# Patient Record
Sex: FEMALE | Race: WHITE | Employment: FULL TIME | URBAN - METROPOLITAN AREA
[De-identification: names, ages, dates, MRNs, and addresses within clinical notes are randomized per-mention and may not be internally consistent; named-entity substitution may affect disease eponyms.]

---

## 2024-01-02 ENCOUNTER — TELEPHONE (OUTPATIENT)
Dept: OTOLARYNGOLOGY | Facility: CLINIC | Age: 44
End: 2024-01-02

## 2024-01-02 ENCOUNTER — OFFICE VISIT (OUTPATIENT)
Dept: OTOLARYNGOLOGY | Facility: CLINIC | Age: 44
End: 2024-01-02
Payer: COMMERCIAL

## 2024-01-02 VITALS — WEIGHT: 110 LBS | HEIGHT: 62 IN | BODY MASS INDEX: 20.24 KG/M2

## 2024-01-02 DIAGNOSIS — Z86.018 S/P RESECTION OF MENINGIOMA: ICD-10-CM

## 2024-01-02 DIAGNOSIS — G43.819 OTHER MIGRAINE WITHOUT STATUS MIGRAINOSUS, INTRACTABLE: ICD-10-CM

## 2024-01-02 DIAGNOSIS — R42 VERTIGO: Primary | ICD-10-CM

## 2024-01-02 DIAGNOSIS — Z98.890 S/P RESECTION OF MENINGIOMA: ICD-10-CM

## 2024-01-02 PROCEDURE — 3008F BODY MASS INDEX DOCD: CPT | Performed by: SPECIALIST

## 2024-01-02 PROCEDURE — 99243 OFF/OP CNSLTJ NEW/EST LOW 30: CPT | Performed by: SPECIALIST

## 2024-01-02 RX ORDER — FERROUS SULFATE 325(65) MG
325 TABLET ORAL EVERY OTHER DAY
COMMUNITY
Start: 2022-04-28

## 2024-01-02 RX ORDER — ONDANSETRON 4 MG/1
4 TABLET, FILM COATED ORAL EVERY 8 HOURS PRN
COMMUNITY

## 2024-01-02 RX ORDER — BUTALBITAL, ACETAMINOPHEN AND CAFFEINE 50; 325; 40 MG/1; MG/1; MG/1
1 TABLET ORAL
COMMUNITY
Start: 2023-12-28

## 2024-01-02 RX ORDER — RIMEGEPANT SULFATE 75 MG/75MG
1 TABLET, ORALLY DISINTEGRATING ORAL EVERY OTHER DAY
COMMUNITY

## 2024-01-02 RX ORDER — PROCHLORPERAZINE 25 MG
25 SUPPOSITORY, RECTAL RECTAL
COMMUNITY
Start: 2023-12-30

## 2024-01-02 RX ORDER — BUPROPION HYDROCHLORIDE 150 MG/1
450 TABLET ORAL EVERY MORNING
COMMUNITY

## 2024-01-02 RX ORDER — LEVOTHYROXINE SODIUM 50 MCG
50 TABLET ORAL DAILY
COMMUNITY
Start: 2022-11-16

## 2024-01-02 RX ORDER — ALBUTEROL SULFATE 90 UG/1
1-2 AEROSOL, METERED RESPIRATORY (INHALATION) EVERY 6 HOURS PRN
COMMUNITY
Start: 2023-02-21

## 2024-01-02 RX ORDER — ADAPALENE 45 G/G
GEL TOPICAL
COMMUNITY
Start: 2021-05-03

## 2024-01-02 NOTE — PATIENT INSTRUCTIONS
As you are having unrelenting nausea and vertigo, an audiogram and vestibular nystagmogram was ordered for you.  I will of course notify you of all results.  You do need to stop the antinausea and vertigo medication 2 to 3 days prior to the VNG.

## 2024-01-02 NOTE — PROGRESS NOTES
Chandni Beaver is a 43 year old female.   Chief Complaint   Patient presents with    Dizziness     Dizziness and nausea    Ear Problem     Fluid in ears     HPI:   Patient with worsening of her vertigo and nausea for the past 1 month.  Has a history of a meningioma which was resected and also a meningioma which has not been resected.  This was last checked December 3, 2023 and noted to be stable.    Current Outpatient Medications   Medication Sig Dispense Refill    Adapalene 0.1 % External Gel Apply thin film to face twice a week at bedtime; gradually increase to nightly as tolerated      albuterol 108 (90 Base) MCG/ACT Inhalation Aero Soln Inhale 1-2 puffs into the lungs every 6 (six) hours as needed.      buPROPion  MG Oral Tablet 24 Hr Take 3 tablets (450 mg total) by mouth every morning.      butalbital-acetaminophen-caffeine -40 MG Oral Tab Take 1 tablet by mouth.      Ferrous Sulfate 325 (65 Fe) MG Oral Tab Take 1 tablet (325 mg total) by mouth every other day.      SYNTHROID 50 MCG Oral Tab Take 1 tablet (50 mcg total) by mouth daily.      ondansetron (ZOFRAN) 4 mg tablet Take 1 tablet (4 mg total) by mouth every 8 (eight) hours as needed for Nausea.      prochlorperazine 25 MG Rectal Suppos Place 1 suppository (25 mg total) rectally.      NURTEC 75 MG Oral Tablet Dispersible Place 1 tablet onto the tongue every other day.      TiZANidine HCl (ZANAFLEX) 4 MG Oral Tab Take 0.5 tablets by mouth every 8 (eight) hours as needed. 20 tablet 0    MethylPREDNISolone (MEDROL, BLAKE,) 4 MG Oral Kit Take as directed 1 Package 0    Nortriptyline HCl (PAMELOR) 10 MG Oral Cap TAKE 3 CAPSULES BY MOUTH EVERY NIGHT AT BEDTIME 90 capsule 12    Rizatriptan Benzoate (MAXALT) 10 MG Oral Tab One at start of migraine.  May repeat once after two hours 9 tablet 12      Past Medical History:   Diagnosis Date    Asthma     History of brain tumor     Migraine       Social History:  Social History     Socioeconomic History     Marital status: Single   Tobacco Use    Smoking status: Never   Vaping Use    Vaping Use: Never used   Substance and Sexual Activity    Alcohol use: No     Comment: rarely    Drug use: No        REVIEW OF SYSTEMS:   GENERAL HEALTH: feels well otherwise  GENERAL : denies fever, chills, sweats, weight loss, weight gain  SKIN: denies any unusual skin lesions or rashes  RESPIRATORY: denies shortness of breath with exertion  NEURO: denies headaches    EXAM:   Ht 5' 2\" (1.575 m)   Wt 110 lb (49.9 kg)   BMI 20.12 kg/m²   System Details   Skin Inspection - Normal.   Constitutional Overall appearance - Normal.   Head/Face Facial features - Normal. Eyebrows - Normal. Skull - Normal.   Eyes Conjunctiva - Right: Normal, Left: Normal. Pupil - Right: Normal, Left: Normal.  Pupils equal round reactive to light and accommodation extraocular motion intact.  No nystagmus   Ears Inspection - Right: Normal, Left: Normal.   Canal - Right: Normal, Left: Normal.   TM - Right: Normal, Left: Normal.   Nasal External nose - Normal.   Nasal septum - Normal.  Turbinates - Normal.   Oral/Oropharynx Lips - Normal, Tonsils - Normal, Tongue - Normal    Neck Exam Inspection - Normal. Palpation - Normal. Parotid gland - Normal. Thyroid gland - Normal.  No carotid bruits   Lymph Detail Submental. Submandibular. Anterior cervical. Posterior cervical. Supraclavicular all without enlargement   Psychiatric Orientation - Oriented to time, place, person & situation. Appropriate mood and affect.   Neurological Memory - Normal. Cranial nerves - Cranial nerves II through XII grossly intact.     ASSESSMENT AND PLAN:   1. Vertigo  Patient with history of 2 meningiomas as above.  1 resected and second 1 not resected but stable.  Last MRI done 12/3/2023.    Similar postsurgical changes prior left suboccipital craniotomy. Similar postsurgical dural thickening and enhancement deep to the craniotomy. Similar left lateral cerebellar hemisphere encephalomalacia and  gliosis. Similar left posterior fossa extra-axial fluid.     Similar since December 31, 2022 and increased since December 23, 2015 now 7 mm left frontal operculum meningioma. No associated significant local mass effect upon or vasogenic edema in the subjacent brain parenchyma.   - Audiology Referral - [facility])  - Audiology Referral - [facility])    2. S/P resection of meningioma  As above.    3. Other migraine without status migrainosus, intractable  Consider vestibular migraine as a possible etiology.      The patient indicates understanding of these issues and agrees to the plan.      Myra Villareal MD  1/2/2024  8:35 AM

## 2024-01-02 NOTE — TELEPHONE ENCOUNTER
Per pt she wants to schedule a hearing test and vng with [facility] and they need the orders faxed to them at [phone]

## 2024-01-02 NOTE — TELEPHONE ENCOUNTER
Patient would like to make an appointment with [facility] for a hearing test and VNG due to [facility] long waiting list. Requesting referrals to be set to [facility] so she can set up an appointment. Paper work faxed to [phone].

## 2024-02-17 ENCOUNTER — HOSPITAL ENCOUNTER (OUTPATIENT)
Age: 44
Discharge: HOME | End: 2024-02-17
Attending: EMERGENCY MEDICINE
Payer: COMMERCIAL

## 2024-02-17 VITALS
HEART RATE: 86 BPM | TEMPERATURE: 98 F | DIASTOLIC BLOOD PRESSURE: 68 MMHG | RESPIRATION RATE: 20 BRPM | OXYGEN SATURATION: 100 % | SYSTOLIC BLOOD PRESSURE: 118 MMHG

## 2024-02-17 DIAGNOSIS — J06.9 VIRAL URI: Primary | ICD-10-CM

## 2024-02-17 LAB
POCT INFLUENZA A: NEGATIVE
POCT INFLUENZA B: NEGATIVE
S PYO AG THROAT QL IA.RAPID: NEGATIVE

## 2024-02-17 PROCEDURE — 99212 OFFICE O/P EST SF 10 MIN: CPT

## 2024-02-17 PROCEDURE — 87651 STREP A DNA AMP PROBE: CPT | Performed by: EMERGENCY MEDICINE

## 2024-02-17 PROCEDURE — 99203 OFFICE O/P NEW LOW 30 MIN: CPT

## 2024-02-17 PROCEDURE — 87502 INFLUENZA DNA AMP PROBE: CPT | Performed by: EMERGENCY MEDICINE

## 2024-02-17 NOTE — ED INITIAL ASSESSMENT (HPI)
Cough, sinus congestion, sore throat , r ear pain since last night, no fever, had - covid home test

## 2024-02-17 NOTE — ED PROVIDER NOTES
Patient Seen in: [facility]      History     Chief Complaint   Patient presents with    Cough/URI     Stated Complaint: Cough - Cough, runny nose, ear pain    Subjective:   HPI    Patient is a 43-year-old female with past history of asthma who presents now with nasal congestion, sore throat, ear pain.  Patient states the symptoms started last night.  The patient also has a minimal cough.  Patient denies any fever.  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  The patient states she did a negative home COVID test this morning.    Objective:   Past Medical History:   Diagnosis Date    Asthma     History of brain tumor     Migraine               Past Surgical History:   Procedure Laterality Date    OTHER SURGICAL HISTORY      Craineotomy    REMOVAL GALLBLADDER                  Social History     Socioeconomic History    Marital status: Single   Tobacco Use    Smoking status: Never   Vaping Use    Vaping Use: Never used   Substance and Sexual Activity    Alcohol use: No     Comment: rarely    Drug use: No              Review of Systems    Positive for stated complaint: Cough - Cough, runny nose, ear pain  Other systems are as noted in HPI.  Constitutional and vital signs reviewed.      All other systems reviewed and negative except as noted above.    Physical Exam     ED Triage Vitals [02/17/24 0931]   /68   Pulse 86   Resp 20   Temp 98.3 °F (36.8 °C)   Temp src Temporal   SpO2 100 %   O2 Device None (Room air)       Current:/68   Pulse 86   Temp 98.3 °F (36.8 °C) (Temporal)   Resp 20   SpO2 100%         Physical Exam    Constitutional: Well-developed well-nourished in no acute distress  Head: Normocephalic, no swelling or tenderness  Eyes: Nonicteric sclera, no conjunctival injection  ENT: There is fluid posterior to the right TM with no significant erythema or loss of landmarks.  There is minimal posterior pharyngeal erythema  Chest: Clear to auscultation, no  tenderness  Cardiovascular: Regular rate and rhythm without murmur  Abdomen: Soft, nontender and nondistended  Neurologic: Patient is awake, alert and oriented ×3.  The patient's motor strength is 5 out of 5 and symmetric in the upper and lower extremities bilaterally  Extremities: No focal swelling or tenderness  Skin: No pallor, no redness or warmth to the touch      ED Course     Labs Reviewed   RAPID STREP A - Normal   POCT FLU TEST - Normal    Narrative:     This assay is a rapid molecular in vitro test utilizing nucleic acid amplification of influenza A and B viral RNA.             Pulse ox is 100% on room air, normal.  Vital signs are stable     Patient's negative flu, negative strep were reviewed with the patient.  Probable viral etiology of the patient's symptoms    MDM      Viral URI versus strep throat versus influenza                                   Medical Decision Making      Disposition and Plan     Clinical Impression:  1. Viral URI         Disposition:  Discharge  2/17/2024  9:57 am    Follow-up:  Williams Kalal, Collette, MD  [address] [place] [address] [id number] [phone]      As needed          Medications Prescribed:  Current Discharge Medication List

## 2024-10-27 ENCOUNTER — HOSPITAL ENCOUNTER (OUTPATIENT)
Age: 44
Discharge: HOME | End: 2024-10-27
Payer: COMMERCIAL

## 2024-10-27 VITALS
SYSTOLIC BLOOD PRESSURE: 119 MMHG | DIASTOLIC BLOOD PRESSURE: 71 MMHG | OXYGEN SATURATION: 100 % | RESPIRATION RATE: 18 BRPM | TEMPERATURE: 99 F | HEART RATE: 77 BPM

## 2024-10-27 DIAGNOSIS — J01.90 ACUTE SINUSITIS, RECURRENCE NOT SPECIFIED, UNSPECIFIED LOCATION: Primary | ICD-10-CM

## 2024-10-27 LAB
POCT INFLUENZA A: NEGATIVE
POCT INFLUENZA B: NEGATIVE
SARS-COV-2 RNA RESP QL NAA+PROBE: NOT DETECTED

## 2024-10-27 PROCEDURE — 99213 OFFICE O/P EST LOW 20 MIN: CPT

## 2024-10-27 PROCEDURE — 87502 INFLUENZA DNA AMP PROBE: CPT | Performed by: PHYSICIAN ASSISTANT

## 2024-10-27 RX ORDER — LEVOFLOXACIN 500 MG/1
500 TABLET, FILM COATED ORAL DAILY
Qty: 7 TABLET | Refills: 0 | Status: SHIPPED | OUTPATIENT
Start: 2024-10-27 | End: 2024-11-03

## 2024-10-27 RX ORDER — FLUTICASONE PROPIONATE 50 MCG
2 SPRAY, SUSPENSION (ML) NASAL DAILY
Qty: 16 G | Refills: 0 | Status: SHIPPED | OUTPATIENT
Start: 2024-10-27 | End: 2024-11-26

## 2024-10-27 NOTE — ED INITIAL ASSESSMENT (HPI)
Patient arrives ambulatory with c/o few days of post nasal drip that she feels running down, cough, dizziness, body aches. Denies fevers. 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

## 2024-10-27 NOTE — ED PROVIDER NOTES
Chief Complaint   Patient presents with    Cold     Entered by patient       History obtained from: patient   services not used     HPI:     Chandni Beaver is a 43 year old female who presents with nasal congestion x several weeks with worsening symptoms x 3-4 days. Patient endorses runny nose, sinus pressure, post-nasal drip, cough, and body aches. Patient has history of sinus infections with similar symptoms. Patient has been taking oral antihistamines without relief. Denies fever, chest pain, shortness of breath, headache, syncope, vomiting.     PMH  Past Medical History:    Asthma (HCC)    History of brain tumor    Migraine       PFSH    PFSH asessment screens reviewed and agree.  Nurses notes reviewed I agree with documentation.    History reviewed. No pertinent family history.  Family history reviewed with patient/caregiver and is not pertinent to presenting problem.  Social History     Socioeconomic History    Marital status: Single     Spouse name: Not on file    Number of children: Not on file    Years of education: Not on file    Highest education level: Not on file   Occupational History    Not on file   Tobacco Use    Smoking status: Never    Smokeless tobacco: Not on file   Vaping Use    Vaping status: Never Used   Substance and Sexual Activity    Alcohol use: No     Comment: rarely    Drug use: No    Sexual activity: Not on file   Other Topics Concern    Not on file   Social History Narrative    Not on file     Social Drivers of Health     Financial Resource Strain: Not on file   Food Insecurity: Low Risk  (6/24/2024)    Received from [facility]    Food Insecurity     Have there been times that your food ran out, and you didn't have money to get more?: No     Are there times that you worry that this might happen?: No   Transportation Needs: Low Risk  (6/24/2024)    Received from [facility]    Transportation Needs     Do you have trouble getting  transportation to medical appointments?: No     How do you normally get to and from your appointments?: Other   Physical Activity: Not on file   Stress: Not on file   Social Connections: Not on file   Housing Stability: Not on file (2024)         ROS:   Positive for stated complaint: nasal congestion, runny nose, sinus pressure, post-nasal drip, cough, body aches   All other systems reviewed and negative except as noted above.  Constitutional and Vital Signs Reviewed.    Physical Exam:     Findings:    /71   Pulse 77   Temp 98.6 °F (37 °C) (Temporal)   Resp 18   LMP 10/13/2024   SpO2 100%   GENERAL: well developed, no acute distress, non-toxic appearing   SKIN: good skin turgor, no obvious rashes  HEAD: normocephalic, atraumatic  EYES: sclera non-icteric bilaterally, conjunctiva clear bilaterally  EARS: canals clear bilaterally, TMs clear bilaterally  NOSE: nasal congestion, bilateral maxillary sinus tenderness   OROPHARYNX: MMM, pharynx clear, no exudates or swelling, uvula midline, no tongue elevation, maintaining airway and secretions  NECK: supple, no lymphadenopathy, no nuchal rigidity, no trismus, no edema, phonation normal    CARDIO: RRR, normal heart sounds   LUNGS: clear to auscultation bilaterally, no increased WOB, no rales, rhonchi, or wheezes  EXTREMITIES: no cyanosis or edema, HERZOG without difficulty  NEURO: no focal deficits  PSYCH: alert and oriented x3, answering questions appropriately, mood appropriate    MDM/Assessment/Plan:   Orders for this encounter:    Orders Placed This Encounter    POCT Flu Test     Order Specific Question:   Release to patient     Answer:   Immediate    Rapid SARS-CoV-2 by PCR     Order Specific Question:   Release to patient     Answer:   Immediate    fluticasone propionate 50 MCG/ACT Nasal Suspension     Si sprays by Nasal route daily.     Dispense:  16 g     Refill:  0    levoFLOXacin 500 MG Oral Tab     Sig: Take 1 tablet (500 mg total) by mouth  daily for 7 days.     Dispense:  7 tablet     Refill:  0       Labs performed this visit:  Recent Results (from the past 10 hours)   POCT Flu Test    Collection Time: 10/27/24 11:11 AM    Specimen: Nares; Other   Result Value Ref Range    POCT INFLUENZA A Negative Negative    POCT INFLUENZA B Negative Negative   Rapid SARS-CoV-2 by PCR    Collection Time: 10/27/24 11:11 AM    Specimen: Nares; Other   Result Value Ref Range    Rapid SARS-CoV-2 by PCR Not Detected Not Detected       Imaging performed this visit:  No orders to display       Medical Decision Making  DDx includes sinusitis versus viral URI versus COVID versus flu versus allergic rhinitis versus other.  Patient is overall well-appearing with stable vitals and tolerating oral intake.  No hypoxia or signs of respiratory distress.  COVID and flu test negative. Discussed possible etiologies of symptoms including viral vs bacterial etiology. Given symptoms compatible with sinus inflammation without improvement and history of sinus infections, shared decision making employed to treat for uncomplicated acute bacterial rhinosinusitis. Patient has extensive list of allergies to antibiotics and states she has been prescribed levofloxacin in the past for sinus infections which she has tolerated well. Denies chance of pregnancy and declines pregnancy test. Will also prescribe nasal spray. Discussed supportive care including rest, increased water intake, OTC Tylenol/Motrin as needed for pain or fevers, and using a humidifier.  Instructed patient to go directly to nearest ER with any worsening or concerning symptoms.  Follow-up with PCP.    Amount and/or Complexity of Data Reviewed  Labs: ordered.    Risk  OTC drugs.  Prescription drug management.          Diagnosis:    ICD-10-CM    1. Acute sinusitis, recurrence not specified, unspecified location  J01.90           All results reviewed and discussed with patient/patient's family. Patient/patient's family verbalize  excellent understanding of instructions and feels comfortable with plan. All of patient's/patient's family's questions were addressed.   See AVS for detailed discharge instructions for your condition today.    Follow Up with:  Williams Kalal, Collette, MD  [address] [place] [id number] [phone]    Schedule an appointment as soon as possible for a visit         Note: This document was dictated using Dragon medical dictation software.  Proofreading was performed to the best of my ability, but errors may be present.    Danitza Juarez PA-C

## 2024-11-15 ENCOUNTER — HOSPITAL ENCOUNTER (OUTPATIENT)
Age: 44
Discharge: HOME | End: 2024-11-15
Attending: STUDENT IN AN ORGANIZED HEALTH CARE EDUCATION/TRAINING PROGRAM
Payer: COMMERCIAL

## 2024-11-15 VITALS
SYSTOLIC BLOOD PRESSURE: 97 MMHG | TEMPERATURE: 98 F | OXYGEN SATURATION: 98 % | DIASTOLIC BLOOD PRESSURE: 64 MMHG | RESPIRATION RATE: 18 BRPM | HEART RATE: 70 BPM

## 2024-11-15 DIAGNOSIS — J06.9 VIRAL URI: Primary | ICD-10-CM

## 2024-11-15 DIAGNOSIS — J02.9 PHARYNGITIS, UNSPECIFIED ETIOLOGY: ICD-10-CM

## 2024-11-15 LAB
S PYO AG THROAT QL IA.RAPID: NEGATIVE
SARS-COV-2 RNA RESP QL NAA+PROBE: NOT DETECTED

## 2024-11-15 PROCEDURE — 99213 OFFICE O/P EST LOW 20 MIN: CPT

## 2024-11-15 PROCEDURE — 87651 STREP A DNA AMP PROBE: CPT | Performed by: STUDENT IN AN ORGANIZED HEALTH CARE EDUCATION/TRAINING PROGRAM

## 2024-11-15 PROCEDURE — 99212 OFFICE O/P EST SF 10 MIN: CPT

## 2024-11-15 NOTE — DISCHARGE INSTRUCTIONS
Your strep and COVID testing was negative.    At this time your exam and evaluation are consistent with a viral infection. Viral infections do not respond to antibiotics and are treated symptomatically. Ensure to rest and maintain hydration. Viral infections can progress and can lead to bacterial infections. If you develop any new, progressing, changing, or worsening signs or symptoms, please present immediately to your primary care physician for reassessment. If you develop any difficulty breathing, chest pain, shortness of breath, difficulty swallowing, drooling, signs or symptoms of dehydration, or any other concerning symptoms, call 911 or present immediately to the emergency department.

## 2024-11-15 NOTE — ED PROVIDER NOTES
Patient Seen in: [facility]      History     Chief Complaint   Patient presents with    Sore Throat     Stated Complaint: Flu - Sore throat    Subjective:   HPI    44-year-old female with past medical history of brain tumors on surveillance, hypothyroidism on Synthroid, on Wellbutrin and on iron, who presents with concern for sore throat, nasal congestion, and ear pressure in the B/L ears since yesterday.  She is a .  She denies any fevers or chills.  She has been taking Tylenol and doing salt water rinses, she is allergic to Augmentin and sulfas.      Objective:     No pertinent past medical history.            No pertinent past surgical history.              No pertinent social history.            Review of Systems    Positive for stated complaint: Flu - Sore throat  Other systems are as noted in HPI.  Constitutional and vital signs reviewed.      All other systems reviewed and negative except as noted above.    Physical Exam     ED Triage Vitals [11/15/24 0825]   BP 97/64   Pulse 70   Resp 18   Temp 98.3 °F (36.8 °C)   Temp src Temporal   SpO2 98 %   O2 Device None (Room air)       Current Vitals:   Vital Signs  BP: 97/64  Pulse: 70  Resp: 18  Temp: 98.3 °F (36.8 °C)  Temp src: Temporal    Oxygen Therapy  SpO2: 98 %  O2 Device: None (Room air)        Physical Exam      General: Awake, alert, comfortable on room air, in no distress, tolerating oral secretions, interactive  Pulmonary: Lungs CTA B, no wheezing, no conversational dyspnea  Neuro: Symmetrical facial expressions on gross observation  HEENT: No periorbital edema or erythema, nonerythematous and nonedematous intact B/L TMs, no erythema or edema of the B/L ear canals, erythematous and edematous nonobstructing B/L nasal turbinates, nasal congestion is present, nonerythematous and nonedematous B/L tonsils, no tonsillar exudates, no peritonsillar edema, mild posterior pharyngeal cobblestoning, uvula midline, tolerating oral  secretions, normal speech, no submandibular edema  Neck: No anterior or posterior cervical lymphadenopathy  Psych: Normal mood, normal affect    ED Course     Labs Reviewed   RAPID STREP A - Normal   RAPID SARS-COV-2 BY PCR - Normal       MDM   Patient is awake, alert, comfortable on room air, no distress, lungs CTAB with no wheezing with no sign of acute bronchospasm, no fevers and no cough and no focal findings on lung exam with no suspicion for pneumonia at this time, no sign of otitis media or otitis externa, no sign of tonsillitis or PTA or deep space infection, but patient does report sore throat 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  -Discussed influenza testing, but patient declines at this time  -We discussed that testing can be a false negative early in the clinical course as patient has only had symptoms since yesterday, but also discussed possible viral etiology to her symptoms.  We therefore discussed the importance of rest, hydration, and over-the-counter Tylenol or ibuprofen if needed for pain control as long as she has no contraindications.  We also discussed over-the-counter intranasal Flonase for rhinitis.  -We discussed that viral infections can make people susceptible to secondary bacterial infections and therefore with any new, changing, or progressing signs or symptoms, I did recommend immediate reassessment.  -Patient declines a work note    Medical Decision Making  Amount and/or Complexity of Data Reviewed  Labs: ordered.    Risk  OTC drugs.        Disposition and Plan     Clinical Impression:  1. Viral URI    2. Pharyngitis, unspecified etiology         Disposition:  Discharge  11/15/2024  8:48 am    Follow-up:  Williams Kalal, Collette, MD  [address] [place] [id number]  [phone]    In 3 days  As needed, If symptoms worsen          Medications Prescribed:  Discharge Medication List as of 11/15/2024  8:55 AM            None

## 2025-02-14 ENCOUNTER — HOSPITAL ENCOUNTER (OUTPATIENT)
Age: 45
Discharge: HOME | End: 2025-02-14
Payer: COMMERCIAL

## 2025-02-14 VITALS
DIASTOLIC BLOOD PRESSURE: 88 MMHG | TEMPERATURE: 98 F | OXYGEN SATURATION: 100 % | RESPIRATION RATE: 20 BRPM | SYSTOLIC BLOOD PRESSURE: 159 MMHG | HEART RATE: 109 BPM

## 2025-02-14 DIAGNOSIS — R03.0 ELEVATED BLOOD PRESSURE READING: ICD-10-CM

## 2025-02-14 DIAGNOSIS — R79.89 ELEVATED SERUM HCG: ICD-10-CM

## 2025-02-14 DIAGNOSIS — R11.2 NAUSEA AND VOMITING IN ADULT: Primary | ICD-10-CM

## 2025-02-14 PROCEDURE — S0119 ONDANSETRON 4 MG: HCPCS

## 2025-02-14 PROCEDURE — 96361 HYDRATE IV INFUSION ADD-ON: CPT

## 2025-02-14 PROCEDURE — 96374 THER/PROPH/DIAG INJ IV PUSH: CPT

## 2025-02-14 PROCEDURE — 99214 OFFICE O/P EST MOD 30 MIN: CPT

## 2025-02-14 RX ORDER — DOXYLAMINE SUCCINATE AND PYRIDOXINE HYDROCHLORIDE, DELAYED RELEASE TABLETS 10 MG/10 MG 10; 10 MG/1; MG/1
2 TABLET, DELAYED RELEASE ORAL NIGHTLY
Qty: 28 TABLET | Refills: 0 | Status: SHIPPED | OUTPATIENT
Start: 2025-02-14 | End: 2025-02-14

## 2025-02-14 RX ORDER — METOCLOPRAMIDE 10 MG/1
10 TABLET ORAL 3 TIMES DAILY PRN
Qty: 20 TABLET | Refills: 0 | Status: SHIPPED | OUTPATIENT
Start: 2025-02-14 | End: 2025-02-14

## 2025-02-14 RX ORDER — SODIUM CHLORIDE 9 MG/ML
1000 INJECTION, SOLUTION INTRAVENOUS ONCE
Status: COMPLETED | OUTPATIENT
Start: 2025-02-14 | End: 2025-02-14

## 2025-02-14 RX ORDER — DOXYLAMINE SUCCINATE AND PYRIDOXINE HYDROCHLORIDE, DELAYED RELEASE TABLETS 10 MG/10 MG 10; 10 MG/1; MG/1
2 TABLET, DELAYED RELEASE ORAL NIGHTLY
Qty: 28 TABLET | Refills: 0 | Status: SHIPPED | OUTPATIENT
Start: 2025-02-14

## 2025-02-14 RX ORDER — METOCLOPRAMIDE 10 MG/1
10 TABLET ORAL 3 TIMES DAILY PRN
Qty: 20 TABLET | Refills: 0 | Status: SHIPPED | OUTPATIENT
Start: 2025-02-14 | End: 2025-03-16

## 2025-02-14 RX ORDER — METOCLOPRAMIDE HYDROCHLORIDE 5 MG/ML
10 INJECTION INTRAMUSCULAR; INTRAVENOUS ONCE
Status: COMPLETED | OUTPATIENT
Start: 2025-02-14 | End: 2025-02-14

## 2025-02-15 NOTE — ED INITIAL ASSESSMENT (HPI)
Patient reports ivf 5-6 days ago.  With uri symptoms since early this week.  Seen at [facility] er on Thursday morning.  - covid, flu and rsv at that time.  Patient with nausea and dry heaving starting this morning.

## 2025-02-15 NOTE — ED PROVIDER NOTES
Patient Seen in: [facility]    History     Chief Complaint   Patient presents with    Cough     Entered by patient     Stated Complaint: Cough, chest congestion, vomiting    HPI    Chandni Beaver is a 44 year old female who presents with chief complaint of nausea and vomiting.  Onset today.  Patient states that she was seen at an outside facility's emergency department for URI symptoms yesterday.  Patient states she tested negative for COVID, flu and RSV yesterday.  Patient denies fever, chills, abdominal pain, diarrhea, constipation, melena, hematochezia, dysuria, hematuria, flank pain, vaginal bleeding, vaginal discharge, chest pain, dyspnea, dizziness, lightheadedness, sore throat, earache.        Past Medical History:    Asthma (HCC)    History of brain tumor    Migraine       Past Surgical History:   Procedure Laterality Date    Other surgical history      Craineotomy    Removal gallbladder              No family history on file.    Social History     Socioeconomic History    Marital status: Single   Tobacco Use    Smoking status: Never   Vaping Use    Vaping status: Never Used   Substance and Sexual Activity    Alcohol use: No     Comment: rarely    Drug use: No     Social Drivers of Health     Food Insecurity: Low Risk  (6/24/2024)    Received from [facility]    Food Insecurity     Have there been times that your food ran out, and you didn't have money to get more?: No     Are there times that you worry that this might happen?: No   Transportation Needs: Low Risk  (6/24/2024)    Received from [facility]    Transportation Needs     Do you have trouble getting transportation to medical appointments?: No     How do you normally get to and from your appointments?: Other       Review of Systems    Positive for stated complaint: Cough, chest congestion, vomiting  Other systems are as noted in HPI.  Constitutional and vital signs reviewed.      All other  systems reviewed and negative except as noted above.    PSFH elements reviewed from today and agreed except as otherwise stated in HPI.    Physical Exam     ED Triage Vitals   BP 02/14/25 1859 (!) 119/94   Pulse 02/14/25 1859 113   Resp 02/14/25 1859 24   Temp 02/14/25 1910 97.6 °F (36.4 °C)   Temp src 02/14/25 1910 Oral   SpO2 02/14/25 1859 100 %   O2 Device 02/14/25 1859 None (Room air)       Current:/88   Pulse 109   Temp 97.6 °F (36.4 °C) (Oral)   Resp 20   LMP 10/13/2024   SpO2 100%     PULSE OX within normal limits on room air as interpreted by this provider.        Physical Exam    Constitutional: The patient is cooperative. Appears well-developed and well-nourished.  Mild discomfort.  Psychological: Alert, No abnormalities of mood, affect.  Head: Normocephalic/atraumatic.  Eyes: Pupils are equal round reactive to light.  Conjunctiva are within normal limits.  ENT: Oropharynx is clear.  Mucous membranes moist.  Neck: The neck is supple.  No meningeal signs.  Chest: There is no tenderness to the chest wall.  No CVA tenderness bilaterally.  Respiratory: Respiratory effort was normal.  There is no rales, wheezes, or rhonchi.  There is no stridor.  Air entry is equal.  Cardiovascular: Tachycardic, regular rhythm.  Capillary refill is brisk.    Gastrointestinal: Abdomen soft, nontender, nondistended.  There is no rebound tenderness or guarding.  No organomegaly is noted. No peritoneal signs.  Normal bowel sounds.  Genitourinary: Not examined.  Lymphatic: No gross lymphadenopathy noted.  Musculoskeletal: Musculoskeletal system is grossly intact.  There is no obvious deformity.  Neurological: Gross motor movement is intact in all 4 extremities.  Patient exhibits normal speech.  Skin: Skin is normal to inspection.  Warm and dry.  No obvious bruising.  No obvious rash.          ED Course   Labs Reviewed - No data to display    MDM     Differential diagnosis including but not limited to gastroenteritis,  hyperemesis, hypovolemia      Patient unable to provide urine sample at this time.    EMR encounter on 12/13/2025 reviewed.  Elevated serum hCG noted.    Physical exam remained stable as previously documented.  Physical exam findings discussed with patient.  Patient tolerating oral liquid prior to discharge.  Patient encouraged to report to emergency department if she is unable to keep fluids down or if she experiences additional vomiting.  Patient voices understanding.    I have given the patient instructions regarding their diagnoses, expectations, follow up, and ER precautions. I explained to the patient that emergent conditions may arise and to go to the ER for new, worsening or any persistent conditions. I've explained the importance of following up with their doctor as instructed. The patient verbalized understanding of the discharge instructions and plan.    The patient was informed of their elevated blood pressure reading at immediate care.  They were informed of the dangers of undiagnosed and untreated hypertension.  Education regarding lifestyle modifications and the need for appropriate follow-up with their PCP to have their blood pressure re-checked within 24-48 hours was provided.    Disposition and Plan     Clinical Impression:  1. Nausea and vomiting in adult    2. Elevated serum hCG    3. Elevated blood pressure reading        Disposition:  There is no disposition on file for this visit.    Follow-up:  Tracy Thomas MD  [address]  [address] [address] [place] [id number] [phone]    Call in 1 day  For follow-up      Medications Prescribed:  Current Discharge Medication List

## 2025-08-28 ENCOUNTER — LAB ENCOUNTER (OUTPATIENT)
Dept: LAB | Age: 45
End: 2025-08-28
Attending: OBSTETRICS & GYNECOLOGY

## 2025-08-28 DIAGNOSIS — O26.859: Primary | ICD-10-CM

## 2025-08-28 LAB — B-HCG SERPL-ACNC: ABNORMAL MIU/ML (ref ?–4.2)

## 2025-08-28 PROCEDURE — 36415 COLL VENOUS BLD VENIPUNCTURE: CPT

## 2025-08-28 PROCEDURE — 84702 CHORIONIC GONADOTROPIN TEST: CPT

## (undated) NOTE — LETTER
1/2/2024          To Whom It May Concern:    Chandni Beaver is currently under my medical care and may not return to work a couple days prior to testing, day of testing and day after.       If you require additional information please contact our office.        Sincerely,    Myra Villareal MD